# Patient Record
Sex: MALE | Race: WHITE | NOT HISPANIC OR LATINO | Employment: FULL TIME | URBAN - METROPOLITAN AREA
[De-identification: names, ages, dates, MRNs, and addresses within clinical notes are randomized per-mention and may not be internally consistent; named-entity substitution may affect disease eponyms.]

---

## 2019-01-06 ENCOUNTER — HOSPITAL ENCOUNTER (EMERGENCY)
Facility: HOSPITAL | Age: 34
Discharge: HOME | End: 2019-01-06
Attending: EMERGENCY MEDICINE | Admitting: EMERGENCY MEDICINE

## 2019-01-06 VITALS
RESPIRATION RATE: 18 BRPM | BODY MASS INDEX: 28.7 KG/M2 | OXYGEN SATURATION: 99 % | WEIGHT: 205 LBS | SYSTOLIC BLOOD PRESSURE: 138 MMHG | DIASTOLIC BLOOD PRESSURE: 72 MMHG | TEMPERATURE: 97.8 F | HEIGHT: 71 IN | HEART RATE: 74 BPM

## 2019-01-06 DIAGNOSIS — M62.830 BACK SPASM: Primary | ICD-10-CM

## 2019-01-06 PROCEDURE — 96372 THER/PROPH/DIAG INJ SC/IM: CPT

## 2019-01-06 PROCEDURE — 99283 EMERGENCY DEPT VISIT LOW MDM: CPT

## 2019-01-06 PROCEDURE — 25010000002 KETOROLAC TROMETHAMINE PER 15 MG: Performed by: EMERGENCY MEDICINE

## 2019-01-06 PROCEDURE — 25010000002 LORAZEPAM PER 2 MG: Performed by: EMERGENCY MEDICINE

## 2019-01-06 RX ORDER — KETOROLAC TROMETHAMINE 30 MG/ML
60 INJECTION, SOLUTION INTRAMUSCULAR; INTRAVENOUS ONCE
Status: COMPLETED | OUTPATIENT
Start: 2019-01-06 | End: 2019-01-06

## 2019-01-06 RX ORDER — TRAMADOL HYDROCHLORIDE 50 MG/1
50 TABLET ORAL EVERY 6 HOURS PRN
Qty: 10 TABLET | Refills: 0 | Status: SHIPPED | OUTPATIENT
Start: 2019-01-06 | End: 2021-10-22

## 2019-01-06 RX ORDER — PREDNISONE 20 MG/1
TABLET ORAL
Qty: 18 TABLET | Refills: 0 | Status: SHIPPED | OUTPATIENT
Start: 2019-01-06 | End: 2021-10-22

## 2019-01-06 RX ORDER — METHOCARBAMOL 750 MG/1
1500 TABLET, FILM COATED ORAL 3 TIMES DAILY PRN
Qty: 30 TABLET | Refills: 0 | Status: SHIPPED | OUTPATIENT
Start: 2019-01-06 | End: 2021-10-22

## 2019-01-06 RX ORDER — FAMOTIDINE 20 MG/1
20 TABLET, FILM COATED ORAL 2 TIMES DAILY
COMMUNITY

## 2019-01-06 RX ORDER — LORAZEPAM 2 MG/ML
1 INJECTION INTRAMUSCULAR ONCE
Status: COMPLETED | OUTPATIENT
Start: 2019-01-06 | End: 2019-01-06

## 2019-01-06 RX ADMIN — LORAZEPAM 1 MG: 2 INJECTION INTRAMUSCULAR; INTRAVENOUS at 10:54

## 2019-01-06 RX ADMIN — KETOROLAC TROMETHAMINE 60 MG: 30 INJECTION, SOLUTION INTRAMUSCULAR at 10:52

## 2019-01-06 NOTE — ED PROVIDER NOTES
Subjective   Mr. Roderick Peralta is a 33 y.o. male who presents to the ED with c/o back pain. He reports that around 2 days ago he woke up with lower back pain that worsened throughout the day and has remained constant since onset. He describes the pain as a spasm that is a 2/10 at rest but is significantly worse with movement. He denies any radiation down his legs, numbness, or incontinence. He has been taking Ibuprofen and Tylenol with no relief. He believes that his pain is from lifting his 90lb boxer up and down the stairs a lot recently. He notes that he has had similar pain in the past after dead lifting but it was always relieved within 24 hours. No other acute complaints at this time.        History provided by:  Patient  Back Pain   Radiates to:  Does not radiate  Pain severity:  Moderate  Duration:  2 days  Timing:  Constant  Chronicity:  New  Worsened by:  Movement  Associated symptoms: no bladder incontinence, no bowel incontinence, no numbness and no perianal numbness        Review of Systems   Gastrointestinal: Negative for bowel incontinence.   Genitourinary: Negative for bladder incontinence.   Musculoskeletal: Positive for back pain.   Neurological: Negative for numbness.       History reviewed. No pertinent past medical history.    No Known Allergies    Past Surgical History:   Procedure Laterality Date   • ELBOW PROCEDURE         History reviewed. No pertinent family history.    Social History     Socioeconomic History   • Marital status:      Spouse name: Not on file   • Number of children: Not on file   • Years of education: Not on file   • Highest education level: Not on file   Tobacco Use   • Smoking status: Never Smoker   • Smokeless tobacco: Never Used   Substance and Sexual Activity   • Alcohol use: No     Frequency: Never   • Drug use: No         Objective   Physical Exam   Constitutional: He is oriented to person, place, and time. He appears well-developed and well-nourished.  No distress.   HENT:   Head: Normocephalic and atraumatic.   Nose: Nose normal.   Eyes: Conjunctivae are normal. No scleral icterus.   Neck: Normal range of motion. Neck supple.   Cardiovascular: Normal rate, regular rhythm and normal heart sounds.   No murmur heard.  Pulmonary/Chest: Effort normal and breath sounds normal. No respiratory distress.   Musculoskeletal: Normal range of motion. He exhibits tenderness.   Patient has TTP over her left latissimus dorsi muscle. No midline or paraspinal muscle TTP. No palpable muscle spasm.   Neurological: He is alert and oriented to person, place, and time.   Skin: Skin is warm and dry. He is not diaphoretic.   Psychiatric: He has a normal mood and affect. His behavior is normal.   Nursing note and vitals reviewed.      Procedures         ED Course     No red flag symptoms.  Ambulatory, grossly neuro intact.  Tender over latissimus dorsi on the left, not in the midline.  Toradol, Ativan IM given with moderate relief.  Patient stable on serial rechecks.  Discussed findings, concerns, plan of care, expected course, reasons to return and followup.                  MDM    Final diagnoses:   Back spasm       Documentation assistance provided by connie Rodriges.  Information recorded by the connie was done at my direction and has been verified and validated by me.     Cece Rodriges  01/06/19 [id number]       Tolu Villalobos MD  01/06/19 1291

## 2021-01-15 ENCOUNTER — IMMUNIZATION (OUTPATIENT)
Dept: VACCINE CLINIC | Facility: HOSPITAL | Age: 36
End: 2021-01-15

## 2021-01-15 PROCEDURE — 91300 HC SARSCOV02 VAC 30MCG/0.3ML IM: CPT | Performed by: INTERNAL MEDICINE

## 2021-01-15 PROCEDURE — 0001A: CPT | Performed by: INTERNAL MEDICINE

## 2021-01-15 PROCEDURE — 0002A: CPT | Performed by: INTERNAL MEDICINE

## 2021-02-05 ENCOUNTER — IMMUNIZATION (OUTPATIENT)
Dept: VACCINE CLINIC | Facility: HOSPITAL | Age: 36
End: 2021-02-05

## 2021-02-05 PROCEDURE — 0002A: CPT | Performed by: PHYSICIAN ASSISTANT

## 2021-02-05 PROCEDURE — 91300 HC SARSCOV02 VAC 30MCG/0.3ML IM: CPT | Performed by: PHYSICIAN ASSISTANT

## 2021-03-15 ENCOUNTER — LAB (OUTPATIENT)
Dept: LAB | Facility: HOSPITAL | Age: 36
End: 2021-03-15

## 2021-03-15 ENCOUNTER — TRANSCRIBE ORDERS (OUTPATIENT)
Dept: LAB | Facility: HOSPITAL | Age: 36
End: 2021-03-15

## 2021-03-15 DIAGNOSIS — Z00.00 ROUTINE GENERAL MEDICAL EXAMINATION AT A HEALTH CARE FACILITY: ICD-10-CM

## 2021-03-15 DIAGNOSIS — Z00.00 ROUTINE GENERAL MEDICAL EXAMINATION AT A HEALTH CARE FACILITY: Primary | ICD-10-CM

## 2021-03-15 LAB
BASOPHILS # BLD AUTO: 0.04 10*3/MM3 (ref 0–0.2)
BASOPHILS NFR BLD AUTO: 0.5 % (ref 0–1.5)
DEPRECATED RDW RBC AUTO: 38.2 FL (ref 37–54)
EOSINOPHIL # BLD AUTO: 0.02 10*3/MM3 (ref 0–0.4)
EOSINOPHIL NFR BLD AUTO: 0.2 % (ref 0.3–6.2)
ERYTHROCYTE [DISTWIDTH] IN BLOOD BY AUTOMATED COUNT: 11.8 % (ref 12.3–15.4)
HCT VFR BLD AUTO: 50.3 % (ref 37.5–51)
HGB BLD-MCNC: 16.7 G/DL (ref 13–17.7)
IMM GRANULOCYTES # BLD AUTO: 0.02 10*3/MM3 (ref 0–0.05)
IMM GRANULOCYTES NFR BLD AUTO: 0.2 % (ref 0–0.5)
LYMPHOCYTES # BLD AUTO: 2.44 10*3/MM3 (ref 0.7–3.1)
LYMPHOCYTES NFR BLD AUTO: 27.9 % (ref 19.6–45.3)
MCH RBC QN AUTO: 29.8 PG (ref 26.6–33)
MCHC RBC AUTO-ENTMCNC: 33.2 G/DL (ref 31.5–35.7)
MCV RBC AUTO: 89.7 FL (ref 79–97)
MONOCYTES # BLD AUTO: 0.86 10*3/MM3 (ref 0.1–0.9)
MONOCYTES NFR BLD AUTO: 9.8 % (ref 5–12)
NEUTROPHILS NFR BLD AUTO: 5.37 10*3/MM3 (ref 1.7–7)
NEUTROPHILS NFR BLD AUTO: 61.4 % (ref 42.7–76)
NRBC BLD AUTO-RTO: 0 /100 WBC (ref 0–0.2)
PLATELET # BLD AUTO: 276 10*3/MM3 (ref 140–450)
PMV BLD AUTO: 9.8 FL (ref 6–12)
RBC # BLD AUTO: 5.61 10*6/MM3 (ref 4.14–5.8)
WBC # BLD AUTO: 8.75 10*3/MM3 (ref 3.4–10.8)

## 2021-03-15 PROCEDURE — 85025 COMPLETE CBC W/AUTO DIFF WBC: CPT

## 2021-03-15 PROCEDURE — 36415 COLL VENOUS BLD VENIPUNCTURE: CPT

## 2021-10-15 ENCOUNTER — IMMUNIZATION (OUTPATIENT)
Dept: VACCINE CLINIC | Facility: HOSPITAL | Age: 36
End: 2021-10-15

## 2021-10-15 PROCEDURE — 91300 HC SARSCOV02 VAC 30MCG/0.3ML IM: CPT | Performed by: INTERNAL MEDICINE

## 2021-10-15 PROCEDURE — 0004A ADM SARSCOV2 30MCG/0.3ML BOOSTER: CPT | Performed by: INTERNAL MEDICINE

## 2021-10-22 ENCOUNTER — TELEMEDICINE (OUTPATIENT)
Dept: NEUROLOGY | Facility: CLINIC | Age: 36
End: 2021-10-22

## 2021-10-22 DIAGNOSIS — G43.119 INTRACTABLE MIGRAINE WITH AURA WITHOUT STATUS MIGRAINOSUS: Primary | ICD-10-CM

## 2021-10-22 PROCEDURE — 99203 OFFICE O/P NEW LOW 30 MIN: CPT | Performed by: PSYCHIATRY & NEUROLOGY

## 2021-10-22 NOTE — PROGRESS NOTES
Subjective:    CC: Roderick Peralta is seen today in consultation through video for migraines.    HPI:  Patient is a 36-year-old male with known past medical history of migraines referred to the clinic for evaluation and management of migraines.  He reports that he started having migraines at age 21.  Usually migraine starts with visual aura characterized by blurred vision, loss of central vision and then it would progress to headache involving bifrontal or bitemporal location.  He denies any sound sensitivity but does report sensitivity and nausea.  He reports that the visual symptoms are the most bothersome.  He reports that sometimes the visual symptoms may go on for 2 to 3 hours and sometimes it may take up to 24 to 48 hours for him to feel back to normal.  He has tried Zomig nasal spray and Imitrex did milligram by mouth as an abortive treatment which sometimes works but mostly he developed side effects including tightness in the neck muscles and shoulder muscles.  He has not tried any other abortive treatments.  He reports that once every other month or every third month, he gets intractable migraine lasting for 2 to 3 days.  He gets not so severe migraines lasting for 3 to 4 hours once a month.  Typical triggers are lack of sleep and sometimes caffeine.      The following portions of the patient's history were reviewed today and updated as of 10/22/2021  : allergies, social history and problem list.  This document will be scanned to patient's chart.      Current Outpatient Medications:   •  famotidine (PEPCID) 20 MG tablet, Take 20 mg by mouth 2 (Two) Times a Day., Disp: , Rfl:   •  ubrogepant (ubrogepant) 100 MG tablet, Take 1 tablet by mouth As Needed (Migraine) for up to 30 days., Disp: 10 tablet, Rfl: 3   No past medical history on file.   Past Surgical History:   Procedure Laterality Date   • ELBOW PROCEDURE        No family history on file.   Review of Systems    All other systems reviewed and are  negative     Objective:    There were no vitals taken for this visit.    Neurology Exam:    General apperance: NAD.     Mental status: Alert, awake and oriented to time place and person.    Recent and Remote memory: Can recall 3/3 objects at 5 minutes. Can recall historical events.     Attention span and Concentration: Serial 7s: Normal.     Fund of knowledge:  Normal.     Language and Speech: No aphasia or dysarthria.    Naming , Repitition and Comprehension:  Can name objects, repeat a sentence and follow commands. Speech is clear and fluent with good repetition, comprehension, and naming.    Cranial Nerves:   CN II: Visual fields are full. Intact. Fundi - Normal, No papillederma, Pupils - JENY  CN III, IV and VI: Extraocular movements are intact. Normal saccades.   CN V: Facial sensation is intact.   CN VII: Muscles of facial expression reveal no asymmetry. Intact.   CN VIII: Hearing is intact. Whispered voice intact.   CN IX and X: Palate elevates symmetrically. Intact  CN XI: Shoulder shrug is intact.   CN XII: Tongue is midline without evidence of atrophy or fasciculation.     Motor:  Right UE muscle strength 5/5. Normal tone.     Left UE muscle strength 5/5. Normal tone.      Right LE muscle strength5/5. Normal tone.     Left LE muscle strength 5/5. Normal tone.      Sensory: Normal light touch, vibration and pinprick sensation bilaterally.    DTRs: 2+ bilaterally in upper and lower extremities.    Babinski: Negative bilaterally.    Co-ordination: Normal finger-to-nose, heel to shin B/L.    Rhomberg: Negative.    Gait: Normal.    Cardiovascular: Regular rate and rhythm without murmur, gallop or rub.    Ophthalmoscopic exam: Normal fundi, no papilledema.    Assessment and Plan:  1. Intractable migraine with aura without status migrainosus  -Intractable migraines with visual aura.  He has had long-term history of migraines.  Currently migraine frequency is approximately 1-2 migraines in a month and 1 intense  migraine every 2 to 3 months.  He has tried Imitrex and Zomig as an abortive treatment but it causes him to have side effects so I will be prescribing him Ubrelvy 100 mg to be taken as needed.  Some of the intense migraines are lasting for 3 to 4 days and hence in future, preventative treatment can always be considered in future for better management.  I have advised him to call office in case if he is interested in starting preventative treatment.  Otherwise, I will plan to see him back in clinic in 6 months for follow-up.     This is a new visit done through video and total time spent was 15 minutes.    Return in about 6 months (around 4/22/2022).     Tim Holley MD

## 2021-10-27 ENCOUNTER — TELEPHONE (OUTPATIENT)
Dept: NEUROLOGY | Facility: OTHER | Age: 36
End: 2021-10-27

## 2021-11-01 RX ORDER — FREMANEZUMAB-VFRM 225 MG/1.5ML
225 INJECTION SUBCUTANEOUS
Qty: 1.5 ML | Refills: 11 | Status: SHIPPED | OUTPATIENT
Start: 2021-11-01 | End: 2022-11-01

## 2021-12-15 ENCOUNTER — TELEPHONE (OUTPATIENT)
Dept: NEUROLOGY | Facility: CLINIC | Age: 36
End: 2021-12-15

## 2021-12-15 NOTE — TELEPHONE ENCOUNTER
Caller: JIMMY     Best call back number: [phone]      What was the call regarding: TIM FROM [facility] FOR UBRELVY IS CALLING TO CHECK ON STATUS   PRE AUTH FOR UBRELVY      Do you require a callback: YES

## 2021-12-16 NOTE — TELEPHONE ENCOUNTER
Insurance information needed to submit a PA, is not in patient chart. I tried contacting pharmacy to obtain this information. So far, I had to LVM.   -TMT

## 2022-03-22 ENCOUNTER — TELEPHONE (OUTPATIENT)
Dept: NEUROLOGY | Facility: CLINIC | Age: 37
End: 2022-03-22

## 2022-03-22 NOTE — TELEPHONE ENCOUNTER
encounter to the clinical pool.    Caller: [facility] - [address] - [phone] [facility] [phone] FX    Relationship: Pharmacy    Best call back number: [phone]  Requested Prescriptions:   Requested Prescriptions      No prescriptions requested or ordered in this encounter    UBRELVY  100 MG     Pharmacy where request should be sent:[place]   LISTED       Additional details provided by patient:  PT IS OUT OF THIS MEDICATION     Does the patient have less than a 3 day supply:  [x] Yes  [] No    Sarah Suarez Rep   03/22/22 08:45 EDT

## 2024-03-07 ENCOUNTER — PRIOR AUTHORIZATION (OUTPATIENT)
Dept: NEUROLOGY | Facility: CLINIC | Age: 39
End: 2024-03-07
Payer: COMMERCIAL

## 2024-03-07 NOTE — TELEPHONE ENCOUNTER
Submitted PA for Ubrelvy 100mg through [employer] website    Case ID: [id number]        DANIELLE Atwood

## 2024-03-15 NOTE — TELEPHONE ENCOUNTER
Pt needing updated rx for Ubrelvy. OK per Dr. Holley. [place] pharmacy as requested by patient.    DANIELLE Atwood

## 2024-11-18 ENCOUNTER — APPOINTMENT (OUTPATIENT)
Facility: HOSPITAL | Age: 39
End: 2024-11-18
Payer: COMMERCIAL

## 2024-11-18 ENCOUNTER — HOSPITAL ENCOUNTER (EMERGENCY)
Facility: HOSPITAL | Age: 39
Discharge: HOME | End: 2024-11-18
Attending: EMERGENCY MEDICINE | Admitting: EMERGENCY MEDICINE
Payer: COMMERCIAL

## 2024-11-18 VITALS
TEMPERATURE: 97.3 F | RESPIRATION RATE: 14 BRPM | HEART RATE: 88 BPM | OXYGEN SATURATION: 100 % | WEIGHT: 215 LBS | BODY MASS INDEX: 30.1 KG/M2 | HEIGHT: 71 IN | SYSTOLIC BLOOD PRESSURE: 119 MMHG | DIASTOLIC BLOOD PRESSURE: 74 MMHG

## 2024-11-18 DIAGNOSIS — R19.7 DIARRHEA, UNSPECIFIED TYPE: Primary | ICD-10-CM

## 2024-11-18 DIAGNOSIS — R10.9 ABDOMINAL CRAMPING: ICD-10-CM

## 2024-11-18 LAB
ALBUMIN SERPL-MCNC: 4 G/DL (ref 3.5–5.2)
ALBUMIN/GLOB SERPL: 1.3 G/DL
ALP SERPL-CCNC: 42 U/L (ref 39–117)
ALT SERPL W P-5'-P-CCNC: 23 U/L (ref 1–41)
ANION GAP SERPL CALCULATED.3IONS-SCNC: 14.2 MMOL/L (ref 5–15)
AST SERPL-CCNC: 33 U/L (ref 1–40)
BASOPHILS # BLD AUTO: 0.03 10*3/MM3 (ref 0–0.2)
BASOPHILS NFR BLD AUTO: 0.2 % (ref 0–1.5)
BILIRUB SERPL-MCNC: 0.3 MG/DL (ref 0–1.2)
BUN SERPL-MCNC: 12 MG/DL (ref 6–20)
BUN/CREAT SERPL: 10.3 (ref 7–25)
CALCIUM SPEC-SCNC: 8.9 MG/DL (ref 8.6–10.5)
CHLORIDE SERPL-SCNC: 103 MMOL/L (ref 98–107)
CO2 SERPL-SCNC: 22.8 MMOL/L (ref 22–29)
CREAT SERPL-MCNC: 1.16 MG/DL (ref 0.76–1.27)
D-LACTATE SERPL-SCNC: 2.2 MMOL/L (ref 0.5–2)
DEPRECATED RDW RBC AUTO: 44.1 FL (ref 37–54)
EGFRCR SERPLBLD CKD-EPI 2021: 81.1 ML/MIN/1.73
EOSINOPHIL # BLD AUTO: 4.44 10*3/MM3 (ref 0–0.4)
EOSINOPHIL NFR BLD AUTO: 26.1 % (ref 0.3–6.2)
ERYTHROCYTE [DISTWIDTH] IN BLOOD BY AUTOMATED COUNT: 14.5 % (ref 12.3–15.4)
GLOBULIN UR ELPH-MCNC: 3.1 GM/DL
GLUCOSE SERPL-MCNC: 111 MG/DL (ref 65–99)
HCT VFR BLD AUTO: 50.3 % (ref 37.5–51)
HGB BLD-MCNC: 17 G/DL (ref 13–17.7)
HOLD SPECIMEN: NORMAL
IMM GRANULOCYTES # BLD AUTO: 0.05 10*3/MM3 (ref 0–0.05)
IMM GRANULOCYTES NFR BLD AUTO: 0.3 % (ref 0–0.5)
LYMPHOCYTES # BLD AUTO: 4.62 10*3/MM3 (ref 0.7–3.1)
LYMPHOCYTES NFR BLD AUTO: 27.1 % (ref 19.6–45.3)
MAGNESIUM SERPL-MCNC: 1.9 MG/DL (ref 1.6–2.6)
MCH RBC QN AUTO: 28.4 PG (ref 26.6–33)
MCHC RBC AUTO-ENTMCNC: 33.8 G/DL (ref 31.5–35.7)
MCV RBC AUTO: 84.1 FL (ref 79–97)
MONOCYTES # BLD AUTO: 1.21 10*3/MM3 (ref 0.1–0.9)
MONOCYTES NFR BLD AUTO: 7.1 % (ref 5–12)
NEUTROPHILS NFR BLD AUTO: 39.2 % (ref 42.7–76)
NEUTROPHILS NFR BLD AUTO: 6.69 10*3/MM3 (ref 1.7–7)
PLATELET # BLD AUTO: 268 10*3/MM3 (ref 140–450)
PMV BLD AUTO: 8.8 FL (ref 6–12)
POTASSIUM SERPL-SCNC: 4.1 MMOL/L (ref 3.5–5.2)
PROCALCITONIN SERPL-MCNC: 0.06 NG/ML (ref 0–0.25)
PROT SERPL-MCNC: 7.1 G/DL (ref 6–8.5)
RBC # BLD AUTO: 5.98 10*6/MM3 (ref 4.14–5.8)
SODIUM SERPL-SCNC: 140 MMOL/L (ref 136–145)
WBC NRBC COR # BLD AUTO: 17.04 10*3/MM3 (ref 3.4–10.8)
WHOLE BLOOD HOLD COAG: NORMAL
WHOLE BLOOD HOLD SPECIMEN: NORMAL

## 2024-11-18 PROCEDURE — 96374 THER/PROPH/DIAG INJ IV PUSH: CPT

## 2024-11-18 PROCEDURE — 25010000002 ONDANSETRON PER 1 MG: Performed by: EMERGENCY MEDICINE

## 2024-11-18 PROCEDURE — 99285 EMERGENCY DEPT VISIT HI MDM: CPT

## 2024-11-18 PROCEDURE — 83735 ASSAY OF MAGNESIUM: CPT

## 2024-11-18 PROCEDURE — 74177 CT ABD & PELVIS W/CONTRAST: CPT

## 2024-11-18 PROCEDURE — 25010000002 MORPHINE PER 10 MG: Performed by: EMERGENCY MEDICINE

## 2024-11-18 PROCEDURE — 25510000001 IOPAMIDOL 61 % SOLUTION: Performed by: EMERGENCY MEDICINE

## 2024-11-18 PROCEDURE — 96375 TX/PRO/DX INJ NEW DRUG ADDON: CPT

## 2024-11-18 PROCEDURE — 36415 COLL VENOUS BLD VENIPUNCTURE: CPT

## 2024-11-18 PROCEDURE — 83605 ASSAY OF LACTIC ACID: CPT

## 2024-11-18 PROCEDURE — 84145 PROCALCITONIN (PCT): CPT

## 2024-11-18 PROCEDURE — 85025 COMPLETE CBC W/AUTO DIFF WBC: CPT | Performed by: EMERGENCY MEDICINE

## 2024-11-18 PROCEDURE — 80053 COMPREHEN METABOLIC PANEL: CPT | Performed by: EMERGENCY MEDICINE

## 2024-11-18 RX ORDER — ONDANSETRON 2 MG/ML
4 INJECTION INTRAMUSCULAR; INTRAVENOUS ONCE
Status: COMPLETED | OUTPATIENT
Start: 2024-11-18 | End: 2024-11-18

## 2024-11-18 RX ORDER — ONDANSETRON 4 MG/1
4 TABLET, ORALLY DISINTEGRATING ORAL EVERY 6 HOURS PRN
Qty: 12 TABLET | Refills: 0 | Status: SHIPPED | OUTPATIENT
Start: 2024-11-18

## 2024-11-18 RX ORDER — DICYCLOMINE HYDROCHLORIDE 10 MG/1
10 CAPSULE ORAL 3 TIMES DAILY PRN
Qty: 30 CAPSULE | Refills: 0 | Status: SHIPPED | OUTPATIENT
Start: 2024-11-18

## 2024-11-18 RX ORDER — IOPAMIDOL 612 MG/ML
100 INJECTION, SOLUTION INTRAVASCULAR
Status: COMPLETED | OUTPATIENT
Start: 2024-11-18 | End: 2024-11-18

## 2024-11-18 RX ORDER — SODIUM CHLORIDE 0.9 % (FLUSH) 0.9 %
10 SYRINGE (ML) INJECTION AS NEEDED
Status: DISCONTINUED | OUTPATIENT
Start: 2024-11-18 | End: 2024-11-18

## 2024-11-18 RX ADMIN — ONDANSETRON 4 MG: 2 INJECTION INTRAMUSCULAR; INTRAVENOUS at 12:41

## 2024-11-18 RX ADMIN — IOPAMIDOL 81 ML: 612 INJECTION, SOLUTION INTRAVENOUS at 13:07

## 2024-11-18 RX ADMIN — MORPHINE SULFATE 4 MG: 4 INJECTION, SOLUTION INTRAMUSCULAR; INTRAVENOUS at 12:41

## 2024-11-18 NOTE — FSED PROVIDER NOTE
"Subjective  History of Present Illness:    Patient is a 39-year-old male presents with diarrhea for multiple days.  Patient states he has still had an appetite, no vomiting but anytime he eats or drinks has diarrhea after.  Patient denies fever or recent travel.  Patient states he has intermittent abdominal cramping that usually does not last very long but today lasted longer.  Patient denies melena or hematochezia.  Patient denies chest pain, shortness of breath, fever, urinary complaints.      Nurses Notes reviewed and agree, including vitals, allergies, social history and prior medical history.     REVIEW OF SYSTEMS: All systems reviewed and not pertinent unless noted.  Review of Systems    History reviewed. No pertinent past medical history.    Allergies:    Patient has no known allergies.      Past Surgical History:   Procedure Laterality Date    ELBOW PROCEDURE           Social History     Socioeconomic History    Marital status:    Tobacco Use    Smoking status: Never    Smokeless tobacco: Never   Substance and Sexual Activity    Alcohol use: No    Drug use: No         History reviewed. No pertinent family history.    Objective  Physical Exam:  /74   Pulse 88   Temp 97.3 °F (36.3 °C) (Oral)   Resp 14   Ht 180.3 cm (71\")   Wt 97.5 kg (215 lb)   SpO2 100%   BMI 29.99 kg/m²      Physical Exam  Constitutional:       Appearance: Well-developed. No acute distress  HENT:      Head: Normocephalic and atraumatic.      Mouth/Throat:      Mouth: Mucous membranes are moist.      Eyes:      Extraocular Movements: Extraocular movements intact.   Cardiovascular:      Rate and Rhythm: Normal rate and regular rhythm.      Heart sounds: Normal heart sounds.   Pulmonary:      Effort: Pulmonary effort is normal. No respiratory distress.      Breath sounds: Normal breath sounds.   Abdominal:      General: There is no distension.      Palpations: Abdomen is soft and diffusely mildly tender.  No rebound " tenderness or guarding noted  Musculoskeletal:         General: No swelling or tenderness.       Extremities: Moves all 4s   Skin:     General: Skin is warm and dry.      Capillary Refill: Capillary refill takes less than 2 seconds.   Neurological:      Mental Status:Alert and oriented to person, place, and time.   Mentation is normal   Psychiatric:         Mood and Affect: Mood normal.         Behavior: Behavior normal.       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 [OM]      ED Course User Index  [OM] Yulia Tavarez PA-C       Lab Results (last 24 hours)       Procedure Component Value Units Date/Time    CBC Auto Differential [[id number]]  (Abnormal) Collected: 11/18/24 1224    Specimen: Blood Updated: 11/18/24 1258     WBC 17.04 10*3/mm3      RBC 5.98 10*6/mm3      Hemoglobin 17.0 g/dL      Hematocrit 50.3 %      MCV 84.1 fL      MCH 28.4 pg      MCHC 33.8 g/dL      RDW 14.5 %      RDW-SD 44.1 fl      MPV 8.8 fL      Platelets 268 10*3/mm3      Neutrophil % 39.2 %      Lymphocyte % 27.1 %      Monocyte % 7.1 %      Eosinophil % 26.1 %      Basophil % 0.2 %      Immature Grans % 0.3 %      Neutrophils, Absolute 6.69 10*3/mm3      Lymphocytes, Absolute 4.62 10*3/mm3      Monocytes, Absolute 1.21 10*3/mm3      Eosinophils, Absolute 4.44 10*3/mm3      Basophils, Absolute 0.03 10*3/mm3      Immature Grans, Absolute 0.05 10*3/mm3     Narrative:      Appended report. These results have been appended to a previously verified report.    Comprehensive Metabolic Panel [[id number]]  (Abnormal) Collected: 11/18/24 1224    Specimen: Blood Updated: 11/18/24 1313     Glucose 111 mg/dL      BUN 12 mg/dL      Creatinine 1.16 mg/dL      Sodium 140 mmol/L      Potassium 4.1 mmol/L      Chloride 103 mmol/L       CO2 22.8 mmol/L      Calcium 8.9 mg/dL      Total Protein 7.1 g/dL      Albumin 4.0 g/dL      ALT (SGPT) 23 U/L      AST (SGOT) 33 U/L      Alkaline Phosphatase 42 U/L      Total Bilirubin 0.3 mg/dL      Globulin 3.1 gm/dL      A/G Ratio 1.3 g/dL      BUN/Creatinine Ratio 10.3     Anion Gap 14.2 mmol/L      eGFR 81.1 mL/min/1.73     Narrative:      GFR Normal >60  Chronic Kidney Disease <60  Kidney Failure <15      Lactic Acid, Plasma [[id number]]  (Abnormal) Collected: 11/18/24 1224    Specimen: Blood Updated: 11/18/24 1257     Lactate 2.2 mmol/L     Procalcitonin [[id number]]  (Normal) Collected: 11/18/24 1224    Specimen: Blood Updated: 11/18/24 1301     Procalcitonin 0.06 ng/mL     Magnesium [[id number]]  (Normal) Collected: 11/18/24 1224    Specimen: Blood Updated: 11/18/24 1313     Magnesium 1.9 mg/dL     Gastrointestinal Panel, PCR - Stool, Per Rectum [[id number]] Updated: 11/18/24 1420    Specimen: Stool from Per Rectum              CT Abdomen Pelvis With Contrast    Result Date: 11/18/2024  CT ABDOMEN PELVIS W CONTRAST Date of Exam: 11/18/2024 12:55 PM EST Indication: pain, diarrhea. Comparison: None available. Technique: Axial CT images were obtained of the abdomen and pelvis following the uneventful intravenous administration of 81 cc Isovue-300. Reconstructed coronal and sagittal images were also obtained. Automated exposure control and iterative construction methods were used. Findings: LUNG BASES:  Unremarkable without mass or infiltrate. LIVER:  Unremarkable parenchyma without focal lesion. BILIARY/GALLBLADDER:  Unremarkable SPLEEN:  Unremarkable PANCREAS:  Unremarkable ADRENAL:  Unremarkable KIDNEYS:  Unremarkable parenchyma with no solid mass identified. No obstruction.  No calculus identified. GASTROINTESTINAL/MESENTERY:  No evidence of obstruction nor inflammation.  There are fluid-filled loops of colon which can be seen in diarrheal state. Appendix is located along the anterior ventral margin of the  cecum. No evidence of appendicitis. MESENTERIC VESSELS:  Patent. AORTA/IVC:  Normal caliber. RETROPERITONEUM/LYMPH NODES:  Unremarkable REPRODUCTIVE:  Unremarkable BLADDER:  Unremarkable OSSEUS STRUCTURES:  Typical for age with no acute process identified.     Impression: Impression: 1.Fluid-filled colon which can be seen in diarrheal state. Unremarkable exam otherwise. Electronically Signed: Jay He MD  11/18/2024 1:27 PM EST  Workstation ID: [id number]        [facility]      Initial impression of presenting illness: Diarrhea times multiple days with abdominal cramping.  No nausea vomiting or fever.    DDX: includes but is not limited to: Gastroenteritis, foodborne illness, diarrheal illness, colitis, gastroenteritis    Patient arrives uncomfortable, vital signs stable with vitals interpreted by myself.     Pertinent results: White count elevated to 17, lactic mildly elevated to 2.2 initially.  Did not want repeat lactate and attending agrees to not repeat. Procal within normal limits.  Other lab work nonactionable.  CT abdomen pelvis shows fluid-filled colon, no other abnormalities    Interventions / Re-evaluation: IV fluids, morphine Zofran   Resting comfortably, states symptoms have improved significantly    Medications   sodium chloride 0.9 % bolus 1,000 mL (0 mL Intravenous Stopped 11/18/24 1431)   morphine injection 4 mg (4 mg Intravenous Given 11/18/24 1241)   ondansetron (ZOFRAN) injection 4 mg (4 mg Intravenous Given 11/18/24 1241)   iopamidol (ISOVUE-300) 61 % injection 100 mL (81 mL Intravenous Given 11/18/24 1307)       Results/clinical rationale were discussed with patient and feeling ember in the room     Consultations/Discussion of results with other physicians: attending    Data interpreted: Nursing notes reviewed, vital signs reviewed.  Labs independently interpreted by me     Counseling: Discussed the results above with the patient regarding need for admission or discharge.  Patient understands and  agrees plan of care.  Discussed with patients the results from today's visit. Discussed with patient strict return precautions and they verbalize understanding. Recommend to them following up with primary care as soon as possible. Patient is discharged hemodynamically stable and comfortable.   Concern for C. difficile or other diarrheal illness so patient given collection kit and will return the stool sample to the lab.    -----  ED Disposition       ED Disposition   Discharge    Condition   Stable    Comment   --             Final diagnoses:   Diarrhea, unspecified type   Abdominal cramping     Your Follow-Up Providers    Follow-up information has not been specified.       Contact information for after-discharge care    Follow-up information has not been specified.          Your medication list        START taking these medications        Instructions Last Dose Given Next Dose Due   dicyclomine 10 MG capsule  Commonly known as: BENTYL      Take 1 capsule by mouth 3 (Three) Times a Day As Needed for Abdominal Cramping.       ondansetron ODT 4 MG disintegrating tablet  Commonly known as: ZOFRAN-ODT      Take 1 tablet by mouth Every 6 (Six) Hours As Needed for Nausea.              CONTINUE taking these medications        Instructions Last Dose Given Next Dose Due   Comirnaty 30 MCG/0.3ML suspension prefilled syringe prefilled syringe  Generic drug: COVID-19 mRNA Vac-Mitzi(Pfizer)      Inject 0.3 mL into the appropriate muscle as directed by prescriber 1 time for 1 dose.       famotidine 20 MG tablet  Commonly known as: PEPCID      Take 20 mg by mouth 2 (Two) Times a Day.       Fluzone 0.5 ML suspension prefilled syringe  Generic drug: influenza virus vacc split PF      Inject 0.5 mL into the appropriate muscle as directed by prescriber 1 time for 1 dose.       ubrogepant 100 MG tablet  Commonly known as: UBRELVY      Take 1 tablet by mouth Daily. Take 1 tablet by mouth As Needed (Migraine) for up to 30 days.                  Where to Get Your Medications        These medications were sent to [facility] - [address], [address], [facility] [address]      Hours: Monday to Friday 9 AM to 5:30 PM Phone: [phone]   dicyclomine 10 MG capsule  ondansetron ODT 4 MG disintegrating tablet

## 2024-11-18 NOTE — DISCHARGE INSTRUCTIONS
Please return if worsening or changing symptoms.  If you are able please return a stool sample to the laboratory for testing.

## 2024-11-22 ENCOUNTER — LAB (OUTPATIENT)
Dept: LAB | Facility: HOSPITAL | Age: 39
End: 2024-11-22
Payer: COMMERCIAL

## 2024-11-22 PROCEDURE — 87493 C DIFF AMPLIFIED PROBE: CPT

## 2024-11-22 PROCEDURE — 87507 IADNA-DNA/RNA PROBE TQ 12-25: CPT | Performed by: EMERGENCY MEDICINE

## 2024-11-23 LAB
ADV 40+41 DNA STL QL NAA+NON-PROBE: NOT DETECTED
ASTRO TYP 1-8 RNA STL QL NAA+NON-PROBE: NOT DETECTED
C CAYETANENSIS DNA STL QL NAA+NON-PROBE: NOT DETECTED
C COLI+JEJ+UPSA DNA STL QL NAA+NON-PROBE: NOT DETECTED
C DIFF TOX GENS STL QL NAA+PROBE: NOT DETECTED
CRYPTOSP DNA STL QL NAA+NON-PROBE: NOT DETECTED
E HISTOLYT DNA STL QL NAA+NON-PROBE: NOT DETECTED
EAEC PAA PLAS AGGR+AATA ST NAA+NON-PRB: NOT DETECTED
EC STX1+STX2 GENES STL QL NAA+NON-PROBE: NOT DETECTED
EPEC EAE GENE STL QL NAA+NON-PROBE: NOT DETECTED
ETEC LTA+ST1A+ST1B TOX ST NAA+NON-PROBE: NOT DETECTED
G LAMBLIA DNA STL QL NAA+NON-PROBE: NOT DETECTED
NOROVIRUS GI+II RNA STL QL NAA+NON-PROBE: NOT DETECTED
P SHIGELLOIDES DNA STL QL NAA+NON-PROBE: NOT DETECTED
RVA RNA STL QL NAA+NON-PROBE: NOT DETECTED
S ENT+BONG DNA STL QL NAA+NON-PROBE: NOT DETECTED
SAPO I+II+IV+V RNA STL QL NAA+NON-PROBE: NOT DETECTED
SHIGELLA SP+EIEC IPAH ST NAA+NON-PROBE: NOT DETECTED
V CHOL+PARA+VUL DNA STL QL NAA+NON-PROBE: NOT DETECTED
V CHOLERAE DNA STL QL NAA+NON-PROBE: NOT DETECTED
Y ENTEROCOL DNA STL QL NAA+NON-PROBE: NOT DETECTED

## 2025-03-02 ENCOUNTER — APPOINTMENT (OUTPATIENT)
Dept: MRI IMAGING | Facility: HOSPITAL | Age: 40
End: 2025-03-02
Payer: COMMERCIAL

## 2025-03-02 ENCOUNTER — HOSPITAL ENCOUNTER (EMERGENCY)
Facility: HOSPITAL | Age: 40
Discharge: HOME | End: 2025-03-02
Attending: EMERGENCY MEDICINE | Admitting: EMERGENCY MEDICINE
Payer: COMMERCIAL

## 2025-03-02 ENCOUNTER — APPOINTMENT (OUTPATIENT)
Dept: CT IMAGING | Facility: HOSPITAL | Age: 40
End: 2025-03-02
Payer: COMMERCIAL

## 2025-03-02 VITALS
RESPIRATION RATE: 14 BRPM | WEIGHT: 215 LBS | BODY MASS INDEX: 30.1 KG/M2 | SYSTOLIC BLOOD PRESSURE: 123 MMHG | HEIGHT: 71 IN | DIASTOLIC BLOOD PRESSURE: 89 MMHG | OXYGEN SATURATION: 97 % | HEART RATE: 95 BPM | TEMPERATURE: 98.2 F
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
## 2025-03-02 DIAGNOSIS — S76.199A: ICD-10-CM

## 2025-03-02 DIAGNOSIS — M25.561 ACUTE PAIN OF RIGHT KNEE: ICD-10-CM

## 2025-03-02 DIAGNOSIS — S76.101A: ICD-10-CM

## 2025-03-02 DIAGNOSIS — S76.109A INJURY OF QUADRICEPS TENDON: Primary | ICD-10-CM

## 2025-03-02 PROCEDURE — 96372 THER/PROPH/DIAG INJ SC/IM: CPT

## 2025-03-02 PROCEDURE — 99284 EMERGENCY DEPT VISIT MOD MDM: CPT

## 2025-03-02 PROCEDURE — 73721 MRI JNT OF LWR EXTRE W/O DYE: CPT

## 2025-03-02 PROCEDURE — 73700 CT LOWER EXTREMITY W/O DYE: CPT

## 2025-03-02 PROCEDURE — 25010000002 KETOROLAC TROMETHAMINE PER 15 MG: Performed by: EMERGENCY MEDICINE

## 2025-03-02 RX ORDER — HYDROCODONE BITARTRATE AND ACETAMINOPHEN 5; 325 MG/1; MG/1
1 TABLET ORAL EVERY 6 HOURS PRN
Qty: 10 TABLET | Refills: 0 | Status: SHIPPED | OUTPATIENT
Start: 2025-03-02

## 2025-03-02 RX ORDER — KETOROLAC TROMETHAMINE 10 MG/1
10 TABLET, FILM COATED ORAL EVERY 6 HOURS PRN
Qty: 20 TABLET | Refills: 0 | Status: SHIPPED | OUTPATIENT
Start: 2025-03-02

## 2025-03-02 RX ORDER — ACETAMINOPHEN 500 MG
1000 TABLET ORAL ONCE
Status: DISCONTINUED | OUTPATIENT
Start: 2025-03-02 | End: 2025-03-02 | Stop reason: HOSPADM

## 2025-03-02 RX ORDER — ACETAMINOPHEN 500 MG
1000 TABLET ORAL EVERY 6 HOURS PRN
Qty: 30 TABLET | Refills: 0 | Status: SHIPPED | OUTPATIENT
Start: 2025-03-02

## 2025-03-02 RX ORDER — KETOROLAC TROMETHAMINE 15 MG/ML
15 INJECTION, SOLUTION INTRAMUSCULAR; INTRAVENOUS ONCE
Status: COMPLETED | OUTPATIENT
Start: 2025-03-02 | End: 2025-03-02

## 2025-03-02 RX ORDER — KETOROLAC TROMETHAMINE 10 MG/1
10 TABLET, FILM COATED ORAL EVERY 6 HOURS PRN
Qty: 20 TABLET | Refills: 0 | Status: SHIPPED | OUTPATIENT
Start: 2025-03-02 | End: 2025-03-02

## 2025-03-02 RX ORDER — HYDROCODONE BITARTRATE AND ACETAMINOPHEN 5; 325 MG/1; MG/1
1 TABLET ORAL EVERY 6 HOURS PRN
Qty: 10 TABLET | Refills: 0 | Status: SHIPPED | OUTPATIENT
Start: 2025-03-02 | End: 2025-03-02

## 2025-03-02 RX ORDER — ACETAMINOPHEN 500 MG
1000 TABLET ORAL EVERY 6 HOURS PRN
Qty: 30 TABLET | Refills: 0 | Status: SHIPPED | OUTPATIENT
Start: 2025-03-02 | End: 2025-03-02

## 2025-03-02 RX ADMIN — KETOROLAC TROMETHAMINE 15 MG: 15 INJECTION, SOLUTION INTRAMUSCULAR; INTRAVENOUS at 15:14

## 2025-03-02 NOTE — ED PROVIDER NOTES
"Subjective   History of Present Illness  Patient is a 39-year-old male presenting to the emergency department with right knee injury after falling partially through the ceiling of an attic.  Patient reports he did not fall completely through.  However, he reports it \"bent a way it is not supposed to\".  Patient reported that he could not extend the knee for several minutes.  He reported that when he finally got it straightened out, that the patella felt abnormal.  The wife states that the patient became pale and nearly passed out.  No loss of consciousness.  Patient denies any other injuries or complaints this time.    History provided by:  Patient and spouse      Review of Systems    History reviewed. No pertinent past medical history.    No Known Allergies    Past Surgical History:   Procedure Laterality Date    ELBOW PROCEDURE         History reviewed. No pertinent family history.    Social History     Socioeconomic History    Marital status:    Tobacco Use    Smoking status: Never    Smokeless tobacco: Never   Substance and Sexual Activity    Alcohol use: No    Drug use: No           Objective   Physical Exam  Vitals and nursing note reviewed.   Constitutional:       General: He is not in acute distress.     Appearance: Normal appearance. He is not toxic-appearing.   Cardiovascular:      Pulses: Normal pulses.   Musculoskeletal:         General: Tenderness and signs of injury present.      Comments: Significant effusion and tenderness to the anterior aspect of the right knee.  The patella does appear to be tracking midline at this time.  However, it is significantly tender and there is some defect noted proximal to the patella possibly representing an attachment.  Neurovascular intact.   Skin:     General: Skin is warm and dry.   Neurological:      Mental Status: He is alert and oriented to person, place, and time.   Psychiatric:         Mood and Affect: Mood normal.         Behavior: Behavior normal. "         Procedures           ED Course  ED Course as of 03/02/25 1535   Sun Mar 02, 2025   1423 CT Lower Extremity Right Without Contrast  I personally reviewed the images of the CT scan of the knee.  Also shared them with the patient.  There does appear to be avulsion fragments off the superior pole of the patella.  There is significant effusion.  See radiology read for details. [RS]   1526 Patient with evidence of quadricep disruption/tear of the right lower extremity.  We will place the patient in a knee immobilizer with crutches to follow-up with orthopedics as soon as possible for further evaluation and management.  I talked the patient and his wife about the symptomatic management care.  He voiced understanding and agreed. I have reviewed results, considerations, and diagnosis with the patient and/or their representative. Anticipatory guidance provided. Follow-up plan reviewed. Precautions for acute return for re-evaluations also reviewed. This including potential for worsening of the presenting condition and need for further evaluation, admission, and/or intervention as indicated. Opportunity to as questions provided. I advised them to return for any concerns and stressed the importance of timely follow-up and outpatient services. They verbalized understanding.   [RS]      ED Course User Index  [RS] Alex Fofana MD                                               [facility] reviewed by Alex Fofana MD       Medical Decision Making  Problems Addressed:  Acute pain of right knee: complicated acute illness or injury  Fall, initial encounter: complicated acute illness or injury  Injury of quadriceps tendon: complicated acute illness or injury  Injury of right quadriceps femoris muscle: complicated acute illness or injury  Traumatic complete division of quadriceps tendon: complicated acute illness or injury    Amount and/or Complexity of Data Reviewed  Independent Historian: spouse  Radiology: ordered.  Decision-making details documented in ED Course.    Risk  OTC drugs.  Prescription drug management.        Final diagnoses:   Injury of quadriceps tendon   Injury of right quadriceps femoris muscle   Fall, initial encounter   Acute pain of right knee   Traumatic complete division of quadriceps tendon       ED Disposition  ED Disposition       ED Disposition   Discharge    Condition   Stable    Comment   --               Abdi Navarro MD  [address] [facility] [address]  [place] [address]  [phone]    Schedule an appointment as soon as possible for a visit       [facility] EMERGENCY DEPARTMENT  [address] [place] [id number] [phone]    As needed, If symptoms worsen or ANY concerns.         Medication List        New Prescriptions      acetaminophen 500 MG tablet  Commonly known as: TYLENOL  Take 2 tablets by mouth Every 6 (Six) Hours As Needed for Mild Pain or Moderate Pain.     HYDROcodone-acetaminophen 5-325 MG per tablet  Commonly known as: NORCO  Take 1 tablet by mouth Every 6 (Six) Hours As Needed for Moderate Pain or Severe Pain.     ketorolac 10 MG tablet  Commonly known as: TORADOL  Take 1 tablet by mouth Every 6 (Six) Hours As Needed for Moderate Pain. Received a dose in the ER.               Where to Get Your Medications        These medications were sent to [facility] [address], [address], [facility] [address]      Hours: Monday to Friday 9 AM to 5:30 PM Phone: [phone]   acetaminophen 500 MG tablet  HYDROcodone-acetaminophen 5-325 MG per tablet  ketorolac 10 MG tablet            Alex Fofana MD  03/02/25 4192

## 2025-03-12 ENCOUNTER — PRIOR AUTHORIZATION (OUTPATIENT)
Dept: NEUROLOGY | Facility: CLINIC | Age: 40
End: 2025-03-12
Payer: COMMERCIAL

## 2025-03-12 NOTE — TELEPHONE ENCOUNTER
Received fax from [facility] to renew Ubrelvy PA. Submitted via fax 3/11/25 with last appt notes. They are requesting more recent notes. Pt last seen in 2021 for telehealth. Sent message to Dr. Holley

## 2025-03-18 ENCOUNTER — TELEMEDICINE (OUTPATIENT)
Dept: NEUROLOGY | Facility: CLINIC | Age: 40
End: 2025-03-18
Payer: COMMERCIAL

## 2025-03-18 DIAGNOSIS — G43.119 INTRACTABLE MIGRAINE WITH AURA WITHOUT STATUS MIGRAINOSUS: Primary | ICD-10-CM

## 2025-03-18 NOTE — PROGRESS NOTES
Subjective:    CC: Roderick Peralta is followed for history of migraines.    HPI:  Initial visit: 10/21/2021:Patient is a 36-year-old male with known past medical history of migraines referred to the clinic for evaluation and management of migraines.  He reports that he started having migraines at age 21.  Usually migraine starts with visual aura characterized by blurred vision, loss of central vision and then it would progress to headache involving bifrontal or bitemporal location.  He denies any sound sensitivity but does report sensitivity and nausea.  He reports that the visual symptoms are the most bothersome.  He reports that sometimes the visual symptoms may go on for 2 to 3 hours and sometimes it may take up to 24 to 48 hours for him to feel back to normal.  He has tried Zomig nasal spray and Imitrex did milligram by mouth as an abortive treatment which sometimes works but mostly he developed side effects including tightness in the neck muscles and shoulder muscles.  He has not tried any other abortive treatments.  He reports that once every other month or every third month, he gets intractable migraine lasting for 2 to 3 days.  He gets not so severe migraines lasting for 3 to 4 hours once a month.  Typical triggers are lack of sleep and sometimes caffeine.    Follow-up: 3/18/2025: This is a follow-up done through video.  Since his initial visit 4 years ago, he reports that overall migraine frequency and intensity remain stable.  He will usually have migraines in a cluster where he will experience more frequent migraines associated with visual aura for about 2 to 3 months usually during fall or spring.  During this months, he will experience on an average 3-5 breakthrough migraines.  She has been taking Ubrelvy as needed as an abortive treatment and it works.    The following portions of the patient's history were reviewed and updated as of 03/18/2025: allergies, social history, and problem list.        Current Outpatient Medications:     acetaminophen (TYLENOL) 500 MG tablet, Take 2 tablets by mouth Every 6 (Six) Hours As Needed for Mild or Moderate Pain., Disp: 30 tablet, Rfl: 0    dicyclomine (BENTYL) 10 MG capsule, Take 1 capsule by mouth 3 (Three) Times a Day As Needed for Abdominal Cramping., Disp: 30 capsule, Rfl: 0    famotidine (PEPCID) 20 MG tablet, Take 20 mg by mouth 2 (Two) Times a Day., Disp: , Rfl:     HYDROcodone-acetaminophen (NORCO) 5-325 MG per tablet, Take 1 tablet by mouth Every 6 (Six) Hours As Needed for Moderate or Severe Pain., Disp: 10 tablet, Rfl: 0    ketorolac (TORADOL) 10 MG tablet, Take 1 tablet by mouth Every 6 (Six) Hours As Needed for Moderate Pain. (Received a dose in the ER), Disp: 20 tablet, Rfl: 0    ondansetron ODT (ZOFRAN-ODT) 4 MG disintegrating tablet, Take 1 tablet by mouth Every 6 (Six) Hours As Needed for Nausea., Disp: 12 tablet, Rfl: 0    ubrogepant (UBRELVY) 100 MG tablet, Take 1 tablet by mouth Daily. Take 1 tablet by mouth As Needed (Migraine) for up to 30 days., Disp: 30 tablet, Rfl: 5   No past medical history on file.   Past Surgical History:   Procedure Laterality Date    ELBOW PROCEDURE        No family history on file.     Review of Systems  Objective:    There were no vitals taken for this visit.    Neurology Exam:  General apperance: NAD.     Mental status: Alert, awake and oriented to time place and person.    Language and Speech: No aphasia or dysarthria.    CN II to XII: Intact.    Opthalmoscopic Exam: No papilledema.    Motor:  Right UE muscle strength 5/5. Normal tone.     Left UE muscle strength 5/5. Normal tone.      Right LE muscle strength 5/5. Normal tone.     Left LE muscle strength 5/5. Normal tone.      Sensory: Normal light touch, vibration and pinprick sensation bilaterally.    DTRs: 2+ bilaterally.    Babinski: Negative bilaterally.    Co-ordination: Normal finger-to-nose, heel to shin B/L.    Rhomberg: Negative.    Gait:  Normal.    Cardiovascular: Regular rate and rhythm without murmur, gallop or rub.    Assessment and Plan:  1. Intractable migraine with aura without status migrainosus  Overall migraine frequency and intensity remain stable.  He continues to experience migraines mostly during spring or fall where he will have on an average 4-6 migraine days in a month lasting for 2 to 3 months associated with visual auras.  Ubrelvy is working well as an abortive treatment so it will be continued.  No preventative treatment for now.  Otherwise I will see him back in clinic in 1 year for follow-up.       I spent 30 minutes in patient care: Reviewing records prior to the visit, entering orders and documentation and spent more than aldridge 50% of this time face-to-face in management, instructions and education regarding above mentioned diagnosis and also on counseling and discussing about taking medication regularly, possible side effects with medication use, importance of good sleep hygiene, good hydration and regular exercise.    No follow-ups on file.       Note to patient: The 21st Century Cures Act makes medical notes like these available to patients in the interest of transparency. However, be advised this is a medical document. It is intended as peer to peer communication. It is written in medical language and may contain abbreviations or verbiage that are unfamiliar. It may appear blunt or direct. Medical documents are intended to carry relevant information, facts as evident, and the clinical opinion of the physician.